# Patient Record
Sex: MALE
[De-identification: names, ages, dates, MRNs, and addresses within clinical notes are randomized per-mention and may not be internally consistent; named-entity substitution may affect disease eponyms.]

---

## 2018-07-05 ENCOUNTER — HOSPITAL ENCOUNTER (EMERGENCY)
Dept: HOSPITAL 5 - ED | Age: 23
Discharge: HOME | End: 2018-07-05
Payer: SELF-PAY

## 2018-07-05 VITALS — SYSTOLIC BLOOD PRESSURE: 124 MMHG | DIASTOLIC BLOOD PRESSURE: 88 MMHG

## 2018-07-05 DIAGNOSIS — S49.91XA: Primary | ICD-10-CM
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
## 2018-07-05 DIAGNOSIS — F17.200: ICD-10-CM
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
## 2018-07-05 PROCEDURE — 99283 EMERGENCY DEPT VISIT LOW MDM: CPT

## 2018-07-05 NOTE — XRAY REPORT
RIGHT SHOULDER, 3 VIEWS:



HISTORY: right shoulder pain.



Normal bone mineralization.  No acute osseous injury or joint pathology 

is detected.  The distal right clavicle appears slightly elevated with 

respect to the acromion which could represent ligamentous injury. The 

soft tissues are unremarkable.



IMPRESSION:

No fracture or dislocation. Question ligamentous injury at the a.c. 

joint.
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
